# Patient Record
Sex: FEMALE | Race: BLACK OR AFRICAN AMERICAN | NOT HISPANIC OR LATINO | URBAN - METROPOLITAN AREA
[De-identification: names, ages, dates, MRNs, and addresses within clinical notes are randomized per-mention and may not be internally consistent; named-entity substitution may affect disease eponyms.]

---

## 2017-10-10 PROBLEM — 31297008 SOMATOFORM DISORDER: Status: ACTIVE | Noted: 2017-10-10

## 2022-04-30 ENCOUNTER — TELEPHONE ENCOUNTER (OUTPATIENT)
Dept: URBAN - METROPOLITAN AREA CLINIC 121 | Facility: CLINIC | Age: 67
End: 2022-04-30

## 2022-04-30 RX ORDER — PANTOPRAZOLE SODIUM 40 MG/1
1 TABLET PO QD TABLET, DELAYED RELEASE ORAL
OUTPATIENT
Start: 2018-02-26

## 2022-05-01 ENCOUNTER — TELEPHONE ENCOUNTER (OUTPATIENT)
Dept: URBAN - METROPOLITAN AREA CLINIC 121 | Facility: CLINIC | Age: 67
End: 2022-05-01

## 2022-05-01 RX ORDER — LOVASTATIN 40 MG/1
TABLET ORAL
Status: ACTIVE | COMMUNITY
Start: 2018-01-09

## 2022-05-01 RX ORDER — CLONAZEPAM 0.5 MG/1
TABLET ORAL
Status: ACTIVE | COMMUNITY
Start: 2017-10-10

## 2022-05-01 RX ORDER — SULINDAC 200 MG
TABLET ORAL
Status: ACTIVE | COMMUNITY
Start: 2017-10-10

## 2022-05-01 RX ORDER — SUCRALFATE 1 G/1
TABLET ORAL
Status: ACTIVE | COMMUNITY
Start: 2017-10-10

## 2022-05-01 RX ORDER — FLUTICASONE PROPIONATE 0.05 MG/G
OINTMENT TOPICAL
Status: ACTIVE | COMMUNITY
Start: 2018-01-09

## 2022-05-01 RX ORDER — GABAPENTIN 300 MG
CAPSULE ORAL
Status: ACTIVE | COMMUNITY
Start: 2017-10-10

## 2022-05-01 RX ORDER — TRAMADOL HCL 50 MG
TABLET ORAL
Status: ACTIVE | COMMUNITY
Start: 2017-10-10

## 2022-05-01 RX ORDER — AMLODIPINE BESYLATE 5 MG/1
TABLET ORAL
Status: ACTIVE | COMMUNITY
Start: 2017-11-29

## 2022-05-01 RX ORDER — BENAZEPRIL HYDROCHLORIDE 10 MG/1
TABLET, COATED ORAL
Status: ACTIVE | COMMUNITY
Start: 2017-11-29

## 2022-05-01 RX ORDER — PANTOPRAZOLE SODIUM 40 MG/1
TAKE 1 TABLET BY MOUTH EVERY DAY TABLET, DELAYED RELEASE ORAL
Status: ACTIVE | COMMUNITY
Start: 2018-10-25

## 2022-05-01 RX ORDER — DESIPRAMINE 25 MG/1
TABLET, FILM COATED ORAL
Status: ACTIVE | COMMUNITY
Start: 2018-01-09

## 2023-03-02 ENCOUNTER — LAB OUTSIDE AN ENCOUNTER (OUTPATIENT)
Dept: URBAN - METROPOLITAN AREA CLINIC 27 | Facility: CLINIC | Age: 68
End: 2023-03-02

## 2023-03-02 ENCOUNTER — OFFICE VISIT (OUTPATIENT)
Dept: URBAN - METROPOLITAN AREA CLINIC 27 | Facility: CLINIC | Age: 68
End: 2023-03-02
Payer: COMMERCIAL

## 2023-03-02 ENCOUNTER — WEB ENCOUNTER (OUTPATIENT)
Dept: URBAN - METROPOLITAN AREA CLINIC 27 | Facility: CLINIC | Age: 68
End: 2023-03-02

## 2023-03-02 ENCOUNTER — DASHBOARD ENCOUNTERS (OUTPATIENT)
Age: 68
End: 2023-03-02

## 2023-03-02 VITALS
RESPIRATION RATE: 17 BRPM | HEART RATE: 88 BPM | BODY MASS INDEX: 29.37 KG/M2 | WEIGHT: 172 LBS | SYSTOLIC BLOOD PRESSURE: 121 MMHG | DIASTOLIC BLOOD PRESSURE: 79 MMHG | HEIGHT: 64 IN

## 2023-03-02 DIAGNOSIS — R07.89 OTHER CHEST PAIN: ICD-10-CM

## 2023-03-02 DIAGNOSIS — Z83.71 FAMILY HISTORY OF POLYPS IN THE COLON: ICD-10-CM

## 2023-03-02 DIAGNOSIS — Z86.010 PERSONAL HISTORY OF COLONIC POLYPS: ICD-10-CM

## 2023-03-02 DIAGNOSIS — E66.9 OBESITY, UNSPECIFIED: ICD-10-CM

## 2023-03-02 DIAGNOSIS — K21.9 GASTRO-ESOPHAGEAL REFLUX DISEASE WITHOUT ESOPHAGITIS: ICD-10-CM

## 2023-03-02 PROBLEM — 266435005 GASTRO-ESOPHAGEAL REFLUX DISEASE WITHOUT ESOPHAGITIS: Status: ACTIVE | Noted: 2017-10-10

## 2023-03-02 PROBLEM — 29857009 CHEST PAIN: Status: ACTIVE | Noted: 2017-10-10

## 2023-03-02 PROBLEM — 429969003 FAMILY HISTORY OF POLYP OF COLON: Status: ACTIVE | Noted: 2023-03-02

## 2023-03-02 PROBLEM — 428283002 HISTORY OF POLYP OF COLON (SITUATION): Status: ACTIVE | Noted: 2023-03-02

## 2023-03-02 PROCEDURE — 99204 OFFICE O/P NEW MOD 45 MIN: CPT | Performed by: INTERNAL MEDICINE

## 2023-03-02 PROCEDURE — 99244 OFF/OP CNSLTJ NEW/EST MOD 40: CPT | Performed by: INTERNAL MEDICINE

## 2023-03-02 RX ORDER — BENAZEPRIL HYDROCHLORIDE 10 MG/1
TABLET, COATED ORAL
Status: ACTIVE | COMMUNITY
Start: 2017-11-29

## 2023-03-02 RX ORDER — FLUTICASONE PROPIONATE 0.05 MG/G
OINTMENT TOPICAL
Status: DISCONTINUED | COMMUNITY
Start: 2018-01-09

## 2023-03-02 RX ORDER — CLONAZEPAM 0.5 MG/1
TABLET ORAL
Status: DISCONTINUED | COMMUNITY
Start: 2017-10-10

## 2023-03-02 RX ORDER — PANTOPRAZOLE SODIUM 40 MG/1
TAKE 1 TABLET BY MOUTH EVERY DAY TABLET, DELAYED RELEASE ORAL
Status: DISCONTINUED | COMMUNITY
Start: 2018-10-25

## 2023-03-02 RX ORDER — SULINDAC 200 MG
TABLET ORAL
Status: ACTIVE | COMMUNITY
Start: 2017-10-10

## 2023-03-02 RX ORDER — LOVASTATIN 40 MG/1
TABLET ORAL
Status: ACTIVE | COMMUNITY
Start: 2018-01-09

## 2023-03-02 RX ORDER — DESIPRAMINE 25 MG/1
TABLET, FILM COATED ORAL
Status: ACTIVE | COMMUNITY
Start: 2018-01-09

## 2023-03-02 RX ORDER — GABAPENTIN 300 MG
CAPSULE ORAL
Status: ACTIVE | COMMUNITY
Start: 2017-10-10

## 2023-03-02 RX ORDER — TRAMADOL HCL 50 MG
TABLET ORAL
Status: ACTIVE | COMMUNITY
Start: 2017-10-10

## 2023-03-02 RX ORDER — AMLODIPINE BESYLATE 5 MG/1
TABLET ORAL
Status: ACTIVE | COMMUNITY
Start: 2017-11-29

## 2023-03-02 RX ORDER — SUCRALFATE 1 G/1
TABLET ORAL
Status: DISCONTINUED | COMMUNITY
Start: 2017-10-10

## 2023-03-02 NOTE — HPI-TODAY'S VISIT:
This is a 67-year-old female seen in consultation for burning in her chest.  After eating she can feel the food go down.  No dysphagia.  But burning in the mid chest.  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  Barium swallow showed dysmotility and pH study was negative.  She takes Prilosec but not every day.  She is not sure that helps.  But thinks her symptoms over the past year has been more progressive.  Colonoscopy was 9 years ago she states.  She had polyps and also her mother has a history of polyps.  No other complaints today

## 2023-03-03 PROBLEM — 414916001 OBESITY: Status: ACTIVE | Noted: 2017-10-10

## 2023-03-31 ENCOUNTER — CLAIMS CREATED FROM THE CLAIM WINDOW (OUTPATIENT)
Dept: URBAN - METROPOLITAN AREA SURGERY CENTER 7 | Facility: SURGERY CENTER | Age: 68
End: 2023-03-31
Payer: COMMERCIAL

## 2023-03-31 DIAGNOSIS — Z86.010 ADENOMAS PERSONAL HISTORY OF COLONIC POLYPS: ICD-10-CM

## 2023-03-31 DIAGNOSIS — K63.5 BENIGN COLON POLYP: ICD-10-CM

## 2023-03-31 PROCEDURE — G8907 PT DOC NO EVENTS ON DISCHARG: HCPCS | Performed by: CLINIC/CENTER

## 2023-03-31 PROCEDURE — G8907 PT DOC NO EVENTS ON DISCHARG: HCPCS | Performed by: INTERNAL MEDICINE

## 2023-03-31 PROCEDURE — 45380 COLONOSCOPY AND BIOPSY: CPT | Performed by: INTERNAL MEDICINE

## 2023-03-31 PROCEDURE — 45380 COLONOSCOPY AND BIOPSY: CPT | Performed by: CLINIC/CENTER

## 2023-03-31 RX ORDER — SULINDAC 200 MG
TABLET ORAL
Status: ACTIVE | COMMUNITY
Start: 2017-10-10

## 2023-03-31 RX ORDER — GABAPENTIN 300 MG
CAPSULE ORAL
Status: ACTIVE | COMMUNITY
Start: 2017-10-10

## 2023-03-31 RX ORDER — TRAMADOL HCL 50 MG
TABLET ORAL
Status: ACTIVE | COMMUNITY
Start: 2017-10-10

## 2023-03-31 RX ORDER — BENAZEPRIL HYDROCHLORIDE 10 MG/1
TABLET, COATED ORAL
Status: ACTIVE | COMMUNITY
Start: 2017-11-29

## 2023-03-31 RX ORDER — AMLODIPINE BESYLATE 5 MG/1
TABLET ORAL
Status: ACTIVE | COMMUNITY
Start: 2017-11-29

## 2023-03-31 RX ORDER — DESIPRAMINE 25 MG/1
TABLET, FILM COATED ORAL
Status: ACTIVE | COMMUNITY
Start: 2018-01-09

## 2023-03-31 RX ORDER — LOVASTATIN 40 MG/1
TABLET ORAL
Status: ACTIVE | COMMUNITY
Start: 2018-01-09

## 2025-03-03 ENCOUNTER — OFFICE VISIT (OUTPATIENT)
Dept: URBAN - METROPOLITAN AREA CLINIC 27 | Facility: CLINIC | Age: 70
End: 2025-03-03
Payer: COMMERCIAL

## 2025-03-03 VITALS
BODY MASS INDEX: 31.07 KG/M2 | HEIGHT: 64 IN | SYSTOLIC BLOOD PRESSURE: 113 MMHG | DIASTOLIC BLOOD PRESSURE: 74 MMHG | HEART RATE: 89 BPM | WEIGHT: 182 LBS

## 2025-03-03 DIAGNOSIS — K59.00 CONSTIPATION, UNSPECIFIED: ICD-10-CM

## 2025-03-03 DIAGNOSIS — Z09 ENCOUNTER FOR COLONOSCOPY FOLLOWING COLON POLYP REMOVAL: ICD-10-CM

## 2025-03-03 DIAGNOSIS — Z83.719 FAMILY HISTORY OF COLON POLYPS, UNSPECIFIED: ICD-10-CM

## 2025-03-03 DIAGNOSIS — K21.9 GASTRO-ESOPHAGEAL REFLUX DISEASE WITHOUT ESOPHAGITIS: ICD-10-CM

## 2025-03-03 DIAGNOSIS — Z86.0100 HISTORY OF COLON POLYPS: ICD-10-CM

## 2025-03-03 PROCEDURE — 99214 OFFICE O/P EST MOD 30 MIN: CPT | Performed by: INTERNAL MEDICINE

## 2025-03-03 RX ORDER — AMLODIPINE BESYLATE 5 MG/1
TABLET ORAL
Status: ACTIVE | COMMUNITY
Start: 2017-11-29

## 2025-03-03 RX ORDER — DESIPRAMINE 25 MG/1
TABLET, FILM COATED ORAL
Status: ACTIVE | COMMUNITY
Start: 2018-01-09

## 2025-03-03 RX ORDER — SULINDAC 200 MG
TABLET ORAL
Status: ACTIVE | COMMUNITY
Start: 2017-10-10

## 2025-03-03 RX ORDER — TRAMADOL HCL 50 MG
TABLET ORAL
Status: ACTIVE | COMMUNITY
Start: 2017-10-10

## 2025-03-03 RX ORDER — GABAPENTIN 300 MG
CAPSULE ORAL
Status: ACTIVE | COMMUNITY
Start: 2017-10-10

## 2025-03-03 RX ORDER — BENAZEPRIL HYDROCHLORIDE 10 MG/1
TABLET, COATED ORAL
Status: ACTIVE | COMMUNITY
Start: 2017-11-29

## 2025-03-03 RX ORDER — LOVASTATIN 40 MG/1
TABLET ORAL
Status: ACTIVE | COMMUNITY
Start: 2018-01-09

## 2025-03-03 NOTE — HPI-TODAY'S VISIT:
This is a 69-year-old female seen in consultation for eating and then getting gas pains her main complaint is that her gas smells bad.  Has been ongoing for 4 to 5 months no new medicines.  Her bowel movements are now every other day.  She states recent laboratory studies and TSH were normal.  She is treated for reflex sympathetic dystrophy.  And hypertension.  She had a manometry a while ago that showed EGJ outflow obstruction with a normal pH study.  Colonoscopy done in 2023 and is due in 2028

## 2025-04-14 ENCOUNTER — OFFICE VISIT (OUTPATIENT)
Dept: URBAN - METROPOLITAN AREA CLINIC 27 | Facility: CLINIC | Age: 70
End: 2025-04-14

## 2025-04-28 ENCOUNTER — OFFICE VISIT (OUTPATIENT)
Dept: URBAN - METROPOLITAN AREA CLINIC 27 | Facility: CLINIC | Age: 70
End: 2025-04-28

## 2025-04-28 RX ORDER — BENAZEPRIL HYDROCHLORIDE 10 MG/1
TABLET, COATED ORAL
Status: ACTIVE | COMMUNITY
Start: 2017-11-29

## 2025-04-28 RX ORDER — GABAPENTIN 300 MG
CAPSULE ORAL
Status: ACTIVE | COMMUNITY
Start: 2017-10-10

## 2025-04-28 RX ORDER — LOVASTATIN 40 MG/1
TABLET ORAL
Status: ACTIVE | COMMUNITY
Start: 2018-01-09

## 2025-04-28 RX ORDER — TRAMADOL HCL 50 MG
TABLET ORAL
Status: ACTIVE | COMMUNITY
Start: 2017-10-10

## 2025-04-28 RX ORDER — DESIPRAMINE 25 MG/1
TABLET, FILM COATED ORAL
Status: ACTIVE | COMMUNITY
Start: 2018-01-09

## 2025-04-28 RX ORDER — SULINDAC 200 MG
TABLET ORAL
Status: ACTIVE | COMMUNITY
Start: 2017-10-10

## 2025-04-28 RX ORDER — AMLODIPINE BESYLATE 5 MG/1
TABLET ORAL
Status: ACTIVE | COMMUNITY
Start: 2017-11-29

## 2025-04-28 NOTE — HPI-ZZZTODAY'S VISIT
Ms. Chavarria is a 69-year-old female patient of Dr. Marcus here for 6-week follow-up of gas and abdominal pain.  Her main complaint was that her gas smelled bad x 4 to 5 months.  BMs were every other day.  No new meds.  Recent labs were normal.  She was advised to start MiraLAX half capful bedtime nightly to see if increased frequency of stools helped with perception of gas and odor.  Can try some Pepto for the smell. . Colonoscopy 2023:1 small hyperplastic polyp

## 2025-06-02 ENCOUNTER — OFFICE VISIT (OUTPATIENT)
Dept: URBAN - METROPOLITAN AREA CLINIC 27 | Facility: CLINIC | Age: 70
End: 2025-06-02
Payer: COMMERCIAL

## 2025-06-02 DIAGNOSIS — R14.0 BLOATING: ICD-10-CM

## 2025-06-02 DIAGNOSIS — K59.00 CONSTIPATION, UNSPECIFIED: ICD-10-CM

## 2025-06-02 PROCEDURE — 99213 OFFICE O/P EST LOW 20 MIN: CPT | Performed by: INTERNAL MEDICINE

## 2025-06-02 RX ORDER — SULINDAC 200 MG
TABLET ORAL
Status: ACTIVE | COMMUNITY
Start: 2017-10-10

## 2025-06-02 RX ORDER — LOVASTATIN 40 MG/1
TABLET ORAL
Status: ACTIVE | COMMUNITY
Start: 2018-01-09

## 2025-06-02 RX ORDER — AMLODIPINE BESYLATE 5 MG/1
TABLET ORAL
Status: ACTIVE | COMMUNITY
Start: 2017-11-29

## 2025-06-02 RX ORDER — BENAZEPRIL HYDROCHLORIDE 10 MG/1
TABLET, COATED ORAL
Status: ACTIVE | COMMUNITY
Start: 2017-11-29

## 2025-06-02 RX ORDER — TRAMADOL HCL 50 MG
TABLET ORAL
Status: ACTIVE | COMMUNITY
Start: 2017-10-10

## 2025-06-02 RX ORDER — DESIPRAMINE 25 MG/1
TABLET, FILM COATED ORAL
Status: ACTIVE | COMMUNITY
Start: 2018-01-09

## 2025-06-02 RX ORDER — GABAPENTIN 300 MG
CAPSULE ORAL
Status: ACTIVE | COMMUNITY
Start: 2017-10-10

## 2025-06-02 NOTE — HPI-HPI
Susan Chavarria is a 69-year-old female who presents with ongoing issues related to constipation and gas. She reports that she has been using Miralax for about a month, which has improved her bowel movements. She is now having bowel movements almost every day, and they are soft. However, she still experiences gas, particularly after eating certain foods. Susan notes that she eats the same foods regularly and has not made any dietary changes. She does not eat breakfast early in the morning, preferring a banana instead. She expresses frustration with the gas, especially when around other people. Susan has not tried taking an extra dose of Miralax when her bowel movements are irregular. She is open to trying dietary changes to address the gas issue.